# Patient Record
Sex: MALE | Race: BLACK OR AFRICAN AMERICAN | NOT HISPANIC OR LATINO | Employment: OTHER | URBAN - METROPOLITAN AREA
[De-identification: names, ages, dates, MRNs, and addresses within clinical notes are randomized per-mention and may not be internally consistent; named-entity substitution may affect disease eponyms.]

---

## 2017-06-19 ENCOUNTER — TELEPHONE (OUTPATIENT)
Dept: INTERNAL MEDICINE | Facility: CLINIC | Age: 57
End: 2017-06-19

## 2017-06-19 NOTE — TELEPHONE ENCOUNTER
----- Message from [employer] sent at 6/19/2017  8:48 AM CDT -----  Pt is requesting a same day for a new pt apt access. Pt states he has high blood pressure and would like a physical also.              Please call pt back at [phone]

## 2017-06-19 NOTE — TELEPHONE ENCOUNTER
Spoke with pt, let him know that  does not have any available until August. Pt requesting to be seen sooner. Tried scheduling appt sooner- schedule would not allow. Gave pt phone number [phone] to get sooner medicaid availability. Pt verbalized understanding.